# Patient Record
Sex: FEMALE | Race: WHITE
[De-identification: names, ages, dates, MRNs, and addresses within clinical notes are randomized per-mention and may not be internally consistent; named-entity substitution may affect disease eponyms.]

---

## 2019-10-24 ENCOUNTER — HOSPITAL ENCOUNTER (OUTPATIENT)
Dept: HOSPITAL 92 - BICULT | Age: 29
Discharge: HOME | End: 2019-10-24
Attending: NURSE PRACTITIONER
Payer: COMMERCIAL

## 2019-10-24 DIAGNOSIS — R59.0: Primary | ICD-10-CM

## 2019-10-24 PROCEDURE — 76536 US EXAM OF HEAD AND NECK: CPT

## 2019-10-24 NOTE — ULT
SOFT TISSUE ULTRASOUND LEFT NECK:

10/24/19

 

INDICATIONS:

Patient presents with palpable nodules left neck just below hairline. 

 

FINDINGS/IMPRESSION:  

The area of concern is imaged with ultrasound. There are three or four small lymph nodes identified b
y ultrasound which measure in the 1.5 cm range. Findings would be consistent with adenopathy correspo
nding to the palpable finding. Correlate for infection or other etiology to explain the adenopathy. I
f there is concern of unexplained neck adenopathy, further evaluation with CT neck with contrast coul
d be performed. 

 

POS: [facility]

## 2021-01-05 ENCOUNTER — HOSPITAL ENCOUNTER (OUTPATIENT)
Dept: HOSPITAL 92 - L&D/OP | Age: 31
Setting detail: OBSERVATION
Discharge: HOME HEALTH | End: 2021-01-05
Attending: OBSTETRICS & GYNECOLOGY | Admitting: OBSTETRICS & GYNECOLOGY
Payer: SELF-PAY

## 2021-01-05 VITALS — TEMPERATURE: 97.9 F | SYSTOLIC BLOOD PRESSURE: 108 MMHG | DIASTOLIC BLOOD PRESSURE: 61 MMHG

## 2021-01-05 VITALS — BODY MASS INDEX: 31.7 KG/M2

## 2021-01-05 DIAGNOSIS — Z79.899: ICD-10-CM

## 2021-01-05 DIAGNOSIS — Z3A.23: ICD-10-CM

## 2021-01-05 DIAGNOSIS — Z79.2: ICD-10-CM

## 2021-01-05 DIAGNOSIS — R10.9: ICD-10-CM

## 2021-01-05 DIAGNOSIS — O23.42: ICD-10-CM

## 2021-01-05 DIAGNOSIS — O99.891: Primary | ICD-10-CM

## 2021-01-05 LAB
SP GR UR STRIP: 1.02 (ref 1–1.04)
WBC UR QL AUTO: (no result) HPF (ref 0–3)

## 2021-01-05 PROCEDURE — 96376 TX/PRO/DX INJ SAME DRUG ADON: CPT

## 2021-01-05 PROCEDURE — G0378 HOSPITAL OBSERVATION PER HR: HCPCS

## 2021-01-05 PROCEDURE — 87086 URINE CULTURE/COLONY COUNT: CPT

## 2021-01-05 PROCEDURE — 96374 THER/PROPH/DIAG INJ IV PUSH: CPT

## 2021-01-05 PROCEDURE — 96375 TX/PRO/DX INJ NEW DRUG ADDON: CPT

## 2021-01-05 PROCEDURE — 76770 US EXAM ABDO BACK WALL COMP: CPT

## 2021-01-05 PROCEDURE — 81001 URINALYSIS AUTO W/SCOPE: CPT

## 2021-01-05 NOTE — HP
PRIMARY OB:  Yvonne Bailey MD.



CHIEF COMPLAINT:  Flank pain and dysuria.



HISTORY OF PRESENT ILLNESS:  The patient is a 30-year-old G1, P0 female with an

intrauterine pregnancy at 23 weeks and 5 days, presenting to Labor and Delivery with

increasing dysuria and flank pain.  The patient was seen earlier today in the clinic

for dysuria and was noted to have evidence of urinary tract infection and was given

a prescription for Macrobid and Azo.  However, as the day progressed, the patient

began experiencing left-sided flank pain radiating around towards the front and

persistent dysuria.  The patient reports she has difficulty finding a comfortable

position.  She denies fever.  She denies headache, chest pain, or shortness of

breath.  The patient reports nausea and vomiting and some diarrhea.  Denies

constipation.  Denies any new rashes, hip problems, knee problems, muscle weakness.

Again, reports dysuria. 



PAST MEDICAL HISTORY:  Migraines.



PAST SURGICAL HISTORY:  She has had wisdom tooth extraction.



ALLERGIES:  NO KNOWN DRUG ALLERGIES.



MEDICATIONS:  

1. Macrobid.

2. Azo.

3. Prenatal vitamins.



SOCIAL HISTORY:  Denies drug, alcohol, or tobacco use.



OB LABS:  Unavailable at the time of dictation.



REVIEW OF SYSTEMS:  Per HPI.



PHYSICAL EXAMINATION:

VITAL SIGNS:  Blood pressure is 120/75, heart rate of 93, temperature 98.4,

respiratory rate of 20. 

GENERAL:  She appears to be in no acute distress.  She is alert and oriented,

cooperative, and pleasant to interact with. 

LUNGS:  Clear. 

HEART:  Has a regular rate and rhythm. 

ABDOMEN:  Soft.  She does have some CVA tenderness on that left side and flank side. 

EXTREMITIES:  Nontender and nonedematous. 

 EXAM:  Deferred.



LABORATORY DATA:  UA shows more than 50 rbc's, 4-6 white blood cells, no squamous

cells, no bacteria, and cultures pending.  Renal ultrasound did not show any

hydronephrosis on preliminary report and reported evidence there could be a stone on

the left side, but nonobstructing.  Fetal heart tracing shows the fetus with the

heart tones in the 140s, appropriate for gestational age. 



ASSESSMENT AND PLAN:  The patient is a 30-year-old female G1, P0, with an

intrauterine pregnancy at 23 weeks and 5 days, presenting with flank pain; recent

diagnosis of urinary tract infection, on Macrobid and Azo.  Additional findings

today include more than 50 red blood cells and no other evidence of infection and

possible stone seen on ultrasound.  The symptoms and findings are consistent with a

likely diagnosis of kidney stone on that left side, nonobstructing.  I have given

her 2 g of Rocephin and have bolused a liter of fluid followed by a second liter of

250 cc an hour followed by a maintenance at 125 of normal saline.  We will be

straining her urine.  The patient has morphine for pain control; has Zofran and

Phenergan for nausea.  There is a urine culture pending.  Dr. Bailey will be

notified in the morning of her admission. 







Job ID:  [id number]

## 2021-01-05 NOTE — ULT
RENAL ULTRASOUND:

 

HISTORY: 

Flank pain.

 

FINDINGS: 

Both kidneys measure approximately 11 cm length.  There is no evidence of hydronephrosis.  Tiny echog
enic foci in the mid collecting structures of the left kidney may represent small renal calculi.

 

The urinary is contracted and not evaluated.  There is evidence of an intrauterine gestation with a f
etus in vertex position which is not evaluated.

 

IMPRESSION: 

1.  Question tiny calculi in the upper collecting structures of the left kidney.

 

2.  Renal ultrasound exam otherwise unremarkable.

 

POS: [facility]

## 2021-05-02 ENCOUNTER — HOSPITAL ENCOUNTER (INPATIENT)
Dept: HOSPITAL 92 - CSHLD | Age: 31
LOS: 3 days | Discharge: HOME | End: 2021-05-05
Attending: OBSTETRICS & GYNECOLOGY | Admitting: OBSTETRICS & GYNECOLOGY
Payer: SELF-PAY

## 2021-05-02 VITALS — BODY MASS INDEX: 35.5 KG/M2

## 2021-05-02 DIAGNOSIS — Z86.16: ICD-10-CM

## 2021-05-02 DIAGNOSIS — Z3A.40: ICD-10-CM

## 2021-05-02 DIAGNOSIS — O48.0: Primary | ICD-10-CM

## 2021-05-02 DIAGNOSIS — Z87.442: ICD-10-CM

## 2021-05-02 LAB
HBSAG INDEX: 0.24 S/CO (ref 0–0.99)
HGB BLD-MCNC: 11.3 G/DL (ref 12–15.5)
MCH RBC QN AUTO: 32.4 PG (ref 27–33)
MCV RBC AUTO: 93.4 FL (ref 81.6–98.3)
PLATELET # BLD AUTO: 234 10X3/UL (ref 150–450)
RBC # BLD AUTO: 3.49 10X6/UL (ref 3.9–5.03)
SYPHILIS ANTIBODY INDEX: 0.03 S/CO
WBC # BLD AUTO: 7 10X3/UL (ref 3.5–10.5)

## 2021-05-02 PROCEDURE — 86780 TREPONEMA PALLIDUM: CPT

## 2021-05-02 PROCEDURE — 85027 COMPLETE CBC AUTOMATED: CPT

## 2021-05-02 PROCEDURE — 86900 BLOOD TYPING SEROLOGIC ABO: CPT

## 2021-05-02 PROCEDURE — 87340 HEPATITIS B SURFACE AG IA: CPT

## 2021-05-02 PROCEDURE — 86901 BLOOD TYPING SEROLOGIC RH(D): CPT

## 2021-05-02 PROCEDURE — 86850 RBC ANTIBODY SCREEN: CPT

## 2021-05-02 PROCEDURE — 51702 INSERT TEMP BLADDER CATH: CPT

## 2021-05-03 PROCEDURE — 0KQM0ZZ REPAIR PERINEUM MUSCLE, OPEN APPROACH: ICD-10-PCS | Performed by: OBSTETRICS & GYNECOLOGY

## 2021-05-03 PROCEDURE — 3E0DXGC INTRODUCTION OF OTHER THERAPEUTIC SUBSTANCE INTO MOUTH AND PHARYNX, EXTERNAL APPROACH: ICD-10-PCS | Performed by: OBSTETRICS & GYNECOLOGY

## 2021-05-03 PROCEDURE — 10907ZC DRAINAGE OF AMNIOTIC FLUID, THERAPEUTIC FROM PRODUCTS OF CONCEPTION, VIA NATURAL OR ARTIFICIAL OPENING: ICD-10-PCS | Performed by: OBSTETRICS & GYNECOLOGY

## 2021-05-03 RX ADMIN — DOCUSATE CALCIUM SCH MG: 240 CAPSULE, LIQUID FILLED ORAL at 21:29

## 2021-05-04 RX ADMIN — DOCUSATE CALCIUM SCH MG: 240 CAPSULE, LIQUID FILLED ORAL at 21:05

## 2021-05-04 RX ADMIN — DOCUSATE CALCIUM SCH MG: 240 CAPSULE, LIQUID FILLED ORAL at 09:02

## 2021-05-05 VITALS — DIASTOLIC BLOOD PRESSURE: 67 MMHG | TEMPERATURE: 97.8 F | SYSTOLIC BLOOD PRESSURE: 112 MMHG

## 2021-05-05 RX ADMIN — DOCUSATE CALCIUM SCH MG: 240 CAPSULE, LIQUID FILLED ORAL at 09:04
